# Patient Record
Sex: MALE
[De-identification: names, ages, dates, MRNs, and addresses within clinical notes are randomized per-mention and may not be internally consistent; named-entity substitution may affect disease eponyms.]

---

## 2023-02-13 ENCOUNTER — APPOINTMENT (OUTPATIENT)
Dept: UROLOGY | Facility: CLINIC | Age: 58
End: 2023-02-13
Payer: COMMERCIAL

## 2023-02-13 VITALS
BODY MASS INDEX: 28 KG/M2 | HEART RATE: 91 BPM | SYSTOLIC BLOOD PRESSURE: 126 MMHG | RESPIRATION RATE: 16 BRPM | DIASTOLIC BLOOD PRESSURE: 88 MMHG | OXYGEN SATURATION: 96 % | HEIGHT: 71 IN | TEMPERATURE: 98.2 F | WEIGHT: 200 LBS

## 2023-02-13 DIAGNOSIS — N39.0 URINARY TRACT INFECTION, SITE NOT SPECIFIED: ICD-10-CM

## 2023-02-13 PROBLEM — Z00.00 ENCOUNTER FOR PREVENTIVE HEALTH EXAMINATION: Status: ACTIVE | Noted: 2023-02-13

## 2023-02-13 PROCEDURE — 99204 OFFICE O/P NEW MOD 45 MIN: CPT | Mod: 25

## 2023-02-13 PROCEDURE — 51798 US URINE CAPACITY MEASURE: CPT

## 2023-02-13 NOTE — HISTORY OF PRESENT ILLNESS
[FreeTextEntry1] : Language: English\par Date of First visit: 02/13/2023 \par Accompanied by: self\par Contact info: \par Referring Provider/PCP:  \par Fax: \par \par \par \par CC/ Problem List:\par LLQ abdominal pain\par UTI\par PSA screening\par ===============================================================================\par FIRST VISIT / Summary:\par Very pleasant 56 yo M who is here after severe pain on left lower abdomen, and was diagnosed with UTI, took what sounds like bactrim but only 1x per day, went to a second Urgent care and they said still had UTI and gave cephalexin 500 TID and ibuprofen. Pain much improved with ibuprofen. Never had kidney stones or infection before. \par \par -------------------------------------------------------------------------------------------\par INTERVAL VISITS:\par \par ===============================================================================\par \par PMH: none\par Meds: none\par All: nkda\par FHx: brother prostate cancer dx age 55-60, father intestinal cancer\par SocHx: never smoker, beer weekends, retired\par \par PSH: none\par \par \par ROS: Review of Systems is as per HPI unless otherwise denoted below\par \par \par ===============================================================================\par DATA: \par \par LABS (SELECTED):---------------------------------------------------------------------------------------------------\par \par \par RADS:-------------------------------------------------------------------------------------------------------------------\par \par \par PATHOLOGY/CYTOLOGY:-------------------------------------------------------------------------------------------\par \par \par VOIDING STUDIES: ----------------------------------------------------------------------------------------------------\par 02/13/2023 PVR 0\par \par STONE STUDIES: (Analysis/LLSA)----------------------------------------------------------------------------------\par \par \par PROCEDURES: -----------------------------------------------------------------------------------------------\par \par \par \par ===============================================================================\par \par PHYSICAL EXAM:\par \par GEN: AAOx3, NAD\par \par PSYCH: Appropriate Behavior, Affect Congruent\par \par Lungs: No labored breathing\par \par GAIT: Gait normal, Stability good\par \par ABD: no suprapubic or CVAT\par \par \par  FOCUSED: --------------(done xx/xx/xxxx)------------------------------------------------------------------------\par \par circumcised by report. exam declined\par \par =======================================================================================\par DISCUSSION: \par possible kidney stone vs colitis/diverticulitis. Also UTI possible pyelonephritis. We discussed all and diagnostic options.\par =======================================================================================\par ASSESSMENT and PLAN\par \par \par 1. UTI\par - on treatment\par - ua culture\par - PVR 0\par - follow-up in 1 week to discuss\par \par 2. Flank pain\par - CT stone hunt\par - ibuprofen as needed\par \par 3. PSA screening\par - will do after acute episode resolves\par \par \par =======================================================================================\par \par Thank you for allowing me to assist in the care of your patient. Should you have any questions please do not hesitate to reach out to me.\par \par \par Amilcar Childs MD\par [facility]\par [facility] [place]\par [address], [address]\[place] [address]\par T: [phone]\par F: [phone]

## 2023-02-14 LAB
APPEARANCE: CLEAR
BACTERIA: NEGATIVE
BILIRUBIN URINE: NEGATIVE
BLOOD URINE: NEGATIVE
COLOR: YELLOW
GLUCOSE QUALITATIVE U: NEGATIVE
HYALINE CASTS: 2 /LPF
KETONES URINE: NEGATIVE
LEUKOCYTE ESTERASE URINE: NEGATIVE
MICROSCOPIC-UA: NORMAL
NITRITE URINE: NEGATIVE
PH URINE: 6.5
PROTEIN URINE: ABNORMAL
RED BLOOD CELLS URINE: 2 /HPF
SPECIFIC GRAVITY URINE: 1.03
SQUAMOUS EPITHELIAL CELLS: 1 /HPF
UROBILINOGEN URINE: ABNORMAL
WHITE BLOOD CELLS URINE: 3 /HPF

## 2023-02-15 LAB — BACTERIA UR CULT: NORMAL

## 2023-03-02 RX ORDER — CEFUROXIME AXETIL 500 MG/1
500 TABLET ORAL
Qty: 14 | Refills: 0 | Status: ACTIVE | COMMUNITY
Start: 2023-03-02 | End: 1900-01-01

## 2023-03-06 LAB — BACTERIA UR CULT: ABNORMAL

## 2023-03-08 ENCOUNTER — NON-APPOINTMENT (OUTPATIENT)
Age: 58
End: 2023-03-08

## 2023-03-08 DIAGNOSIS — N41.9 INFLAMMATORY DISEASE OF PROSTATE, UNSPECIFIED: ICD-10-CM

## 2023-03-08 RX ORDER — L. ACIDOPHILUS/L.BULGARICUS 1MM CELL
TABLET ORAL
Qty: 30 | Refills: 0 | Status: ACTIVE | COMMUNITY
Start: 2023-03-08 | End: 1900-01-01

## 2023-03-08 RX ORDER — AMOXICILLIN AND CLAVULANATE POTASSIUM 875; 125 MG/1; MG/1
875-125 TABLET, COATED ORAL
Qty: 56 | Refills: 0 | Status: ACTIVE | COMMUNITY
Start: 2023-03-08 | End: 1900-01-01

## 2023-04-17 LAB — BACTERIA UR CULT: ABNORMAL

## 2023-07-20 ENCOUNTER — APPOINTMENT (OUTPATIENT)
Dept: UROLOGY | Facility: CLINIC | Age: 58
End: 2023-07-20